# Patient Record
Sex: MALE | Race: WHITE | NOT HISPANIC OR LATINO | URBAN - METROPOLITAN AREA
[De-identification: names, ages, dates, MRNs, and addresses within clinical notes are randomized per-mention and may not be internally consistent; named-entity substitution may affect disease eponyms.]

---

## 2018-01-24 ENCOUNTER — OFFICE VISIT - HEALTHEAST (OUTPATIENT)
Dept: FAMILY MEDICINE | Facility: CLINIC | Age: 33
End: 2018-01-24

## 2018-01-24 DIAGNOSIS — J32.9 SINUSITIS: ICD-10-CM

## 2018-01-24 ASSESSMENT — MIFFLIN-ST. JEOR: SCORE: 1904

## 2021-05-25 ENCOUNTER — RECORDS - HEALTHEAST (OUTPATIENT)
Dept: ADMINISTRATIVE | Facility: CLINIC | Age: 36
End: 2021-05-25

## 2021-06-01 VITALS — HEIGHT: 74 IN | WEIGHT: 200 LBS | BODY MASS INDEX: 25.67 KG/M2

## 2021-06-15 NOTE — PROGRESS NOTES
"Assessment:  1.  Sinusitis.  2.  Nicotine dependence.    Plan: Azithromycin 250 mg-#6-2 p.o. today then 1 p.o. on days 2 through 5.  Symptom Medicare.  Recommend smoking cessation.  Increase rest, regular fluid ingestion to keep throat coated etc.  Call or return if not clearing well.  He understands and agrees.  I explained that his infection could be viral versus bacterial, but given his worsening 1-1/2 weeks into this, is being a smoker, etc. is reasonable to try the antibiotic.    Subjective: 32-year-old male presenting for evaluation of illness of 1-1/2 weeks duration.  He has had cough and congestion and has been bringing up any colored sputum every morning.  By the afternoon it is somewhat more clear but each morning he continues to have the greenish thick sputum.  He does smoke three-quarter pack per day but has reduced recently to 3 cigarettes per day.  Notes that he now is developing hoarseness.  So instead of getting better is feeling worse.  Past Medical History:   Diagnosis Date     Obesity     S/P Divine-en-Y gastric bypass     No Known Allergies  Current meds have included use of hydrocortisone cream as needed, ibuprofen as needed, and has taken vitamin D in the past.  All other review systems negative.    Objective:/70  Pulse 78  Temp 98.3  F (36.8  C) (Oral)   Ht 6' 1.5\" (1.867 m)  Wt 200 lb (90.7 kg)  BMI 26.03 kg/m2  Head number cephalic.  External ears and TMs normal.  Eyes show no acute change.  Nasal congestion.  Pharynx shows some erythema.  Neck supple but no adenopathy or thyromegaly.  Lungs are clear to auscultation.  Heart regular rate and rhythm without murmur.  "